# Patient Record
Sex: MALE | Race: OTHER
[De-identification: names, ages, dates, MRNs, and addresses within clinical notes are randomized per-mention and may not be internally consistent; named-entity substitution may affect disease eponyms.]

---

## 2019-09-27 ENCOUNTER — HOSPITAL ENCOUNTER (OUTPATIENT)
Dept: HOSPITAL 72 - SUR | Age: 44
Discharge: HOME | End: 2019-09-27
Payer: COMMERCIAL

## 2019-09-27 VITALS — SYSTOLIC BLOOD PRESSURE: 127 MMHG | DIASTOLIC BLOOD PRESSURE: 58 MMHG

## 2019-09-27 VITALS — DIASTOLIC BLOOD PRESSURE: 60 MMHG | SYSTOLIC BLOOD PRESSURE: 113 MMHG

## 2019-09-27 VITALS — BODY MASS INDEX: 23.39 KG/M2 | HEIGHT: 63 IN | WEIGHT: 132 LBS

## 2019-09-27 VITALS — DIASTOLIC BLOOD PRESSURE: 56 MMHG | SYSTOLIC BLOOD PRESSURE: 125 MMHG

## 2019-09-27 VITALS — DIASTOLIC BLOOD PRESSURE: 58 MMHG | SYSTOLIC BLOOD PRESSURE: 121 MMHG

## 2019-09-27 VITALS — DIASTOLIC BLOOD PRESSURE: 56 MMHG | SYSTOLIC BLOOD PRESSURE: 123 MMHG

## 2019-09-27 VITALS — DIASTOLIC BLOOD PRESSURE: 79 MMHG | SYSTOLIC BLOOD PRESSURE: 121 MMHG

## 2019-09-27 VITALS — DIASTOLIC BLOOD PRESSURE: 61 MMHG | SYSTOLIC BLOOD PRESSURE: 128 MMHG

## 2019-09-27 VITALS — SYSTOLIC BLOOD PRESSURE: 124 MMHG | DIASTOLIC BLOOD PRESSURE: 56 MMHG

## 2019-09-27 DIAGNOSIS — Z79.899: ICD-10-CM

## 2019-09-27 DIAGNOSIS — I10: ICD-10-CM

## 2019-09-27 DIAGNOSIS — M75.42: Primary | ICD-10-CM

## 2019-09-27 DIAGNOSIS — E78.00: ICD-10-CM

## 2019-09-27 DIAGNOSIS — E11.9: ICD-10-CM

## 2019-09-27 DIAGNOSIS — Z79.84: ICD-10-CM

## 2019-09-27 PROCEDURE — 94150 VITAL CAPACITY TEST: CPT

## 2019-09-27 PROCEDURE — 29822 SHO ARTHRS SRG LMTD DBRDMT: CPT

## 2019-09-27 PROCEDURE — 94003 VENT MGMT INPAT SUBQ DAY: CPT

## 2019-09-27 PROCEDURE — 82962 GLUCOSE BLOOD TEST: CPT

## 2019-09-27 NOTE — OPERATIVE NOTE - DICTATED
DATE OF OPERATION:  09/27/2019

PREOPERATIVE DIAGNOSIS:  Left shoulder impingement syndrome.



POSTOPERATIVE DIAGNOSIS:  Left shoulder impingement syndrome.



PROCEDURE:

1. Left shoulder diagnostic arthroscopy.

2. Left shoulder subacromial decompression bursectomy.



SURGEON:  Milton Lee M.D.



ANESTHESIA:  Interscalene general.



INDICATION FOR PROCEDURE:  The patient is a pleasant gentleman with

continued left shoulder pain, failed conservative treatment, elected to

undergo left shoulder diagnostic arthroscopy and subacromial decompression

bursectomy.  Risks, limitations, expectations, complications of procedure

were discussed in detail.  All questions addressed.



DESCRIPTION OF PROCEDURE:  After informed consent was obtained, the patient

was brought to the operating room.  The patient was placed under

interscalene general anesthesia.  The patient then placed in a beach chair

position.  Left shoulder was prepped and draped in a sterile manner.

Time-out was performed.  Inferolateral stab incision was then made.

Trocar was introduced into the glenohumeral joint.  There is no

significant chondral damage.  The anterior labrum appeared to be intact

along with the superior labrum.  The biceps tendon was intact along with

the subscap.  The supraspinatus and infraspinatus were intact.



At this point, the camera was then placed in the subacromial space.

There was hypertrophic bursal tissue.  Undersurface of acromion was

identified.  Acromioplasty was started from medial to lateral completed

from posterior to anterior.  The bursectomy was completed.  Instruments

removed.  Portal sites were closed with 3-0 Monocryl sutures.

Steri-Strips and a sterile dressing were applied.



ESTIMATED BLOOD LOSS:  None.



COMPLICATIONS:  None.



SPECIMENS:  None.



IMPLANTS:  None.









  ______________________________________________

  Milton Lee M.D.





DR:  ANAY

D:  09/27/2019 14:06

T:  09/27/2019 17:51

JOB#:  [id number]

CC:

## 2019-09-27 NOTE — ANETHESIA PREOPERATIVE EVAL
Anesthesia Pre-op PMH/ROS


General


Date of Evaluation:  Sep 27, 2019


Time of Evaluation:  12:32


Anesthesiologist:  Juan


ASA Score:  ASA 3


Mallampati Score


Class I : Soft palate, uvula, fauces, pillars visible


Class II: Soft palate, uvula, fauces visible


Class III: Soft palate, base of uvula visible


Class IV: Only hard plate visible


Mallampati Classification:  Class II


Surgeon:  Jesus


Diagnosis:  L Shoulder Pain


Surgical Procedure:  L Shoulder Arthroscopy


Anesthesia History:  none


Family History:  no anesthesia problems


Allergies:  


Coded Allergies:  


     No Known Allergies (Unverified , 19)


Medications:  see eMAR


Patient NPO?:  Yes





Past Medical History


Cardiovascular:  Reports: HTN, other - HL


Endocrine:  Reports: DM





Anesthesia Pre-op Phys. Exam


Physician Exam





Last Vital Signs








  Date Time  Temp Pulse Resp B/P (MAP) Pulse Ox O2 Delivery O2 Flow Rate FiO2


 


19 11:56      Room Air  


 


19 11:44 97.7 60 18 113/60 99   








Constitutional:  NAD


Neurologic:  CN 2-12 intact


Cardiovascular:  RRR


Respiratory:  CTA


Gastrointestinal:  S/NT/ND





Airway Exam


Mallampati Score:  Class II


MO:  full


ROM:  full


Teeth:  intact





Anesthesia Pre-op A/P


Risk Assessment & Plan


Assessment:


ASA 3


Plan:


GA, SED, Supraclavicular Block





Pre-Antibiotics


Dru Gram Ancef IV


Given Within 1 Hr of Incision:  Yes


Time Given:  12:55











Antonio Martinez MD Sep 27, 2019 12:18

## 2019-09-27 NOTE — PRE-PROCEDURE NOTE/ATTESTATION
Pre-Procedure Note/Attestation


Complete Prior to Procedure


Planned Procedure:  left


Procedure Narrative:


shoulder arthroscopy, sad





Indications for Procedure


Pre-Operative Diagnosis:


left shoulder impingement





Attestation


I attest that I discussed the nature of the procedure; its benefits; risks and 

complications; and alternatives (and the risks and benefits of such alternatives

), prior to the procedure, with the patient (or the patient's legal 

representative).





I attest that, if there was a reasonable possibility of needing a blood 

transfusion, the patient (or the patient's legal representative) was given the 

[facility] standardized written summary, pursuant 

to the [place] Blood Safety Act ([place] Health and Safety Code # 1645, as 

amended).





I attest that I re-evaluated the patient just prior to the surgery and that 

there has been no change in the patient's H&P, except as documented below:











Milton Lee MD Sep 27, 2019 07:28

## 2019-09-27 NOTE — IMMEDIATE POST-OP EVALUATION
Immediate Post-Op Evalulation


Immediate Post-Op Evalulation


Procedure:  L Shoulder Arthroscopy


Date of Evaluation:  Sep 27, 2019


Time of Evaluation:  14:37


IV Fluids:  600 LR


Blood Products:  0


Estimated Blood Loss:  10


Urinary Output:  0


Blood Pressure Systolic:  128


Blood Pressure Diastolic:  61


Pulse Rate:  91


Respiratory Rate:  16


O2 Sat by Pulse Oximetry:  100


Temperature (Fahrenheit):  97


Pain Score (1-10):  2


Nausea:  No


Vomiting:  No


Complications


0


Patient Status:  awake, reacts, patent, extubated, none


Hydration Status:  adequate


Dru Gram Ancef IV


Given Within 1 Hr of Incision:  Yes


Time Given:  12:55











Antonio Martinez MD Sep 27, 2019 12:19

## 2019-09-27 NOTE — 48 HOUR POST ANESTHESIA EVAL
Post Anesthesia Evaluation


Procedure:  L Shoulder Arthroscopy


Date of Evaluation:  Sep 27, 2019


Time of Evaluation:  16:47


Blood Pressure Systolic:  132


0:  78


Pulse Rate:  82


Respiratory Rate:  18


Temperature (Fahrenheit):  98.2


O2 Sat by Pulse Oximetry:  99


Airway:  patent


Nausea:  No


Vomiting:  No


Pain Intensity:  1


Hydration Status:  adequate


Cardiopulmonary Status:


Stable


Mental Status/LOC:  patient returned to baseline


Follow-up Care/Observations:


0


Post-Anesthesia Complications:


0


Follow-up care needed:  ready to discharge











Antonio Martinez MD Sep 27, 2019 12:20

## 2019-09-27 NOTE — OPERATIVE NOTE - PDOC
Operative Note


Operative Note


Pre-op Diagnosis:


left shoulder impingement


Procedure:


see op report


Post-op Diagnosis:  same as pre-op plus


Operative Findings:  consistent w/pre-op dx studies


Anesthesia:  MAC


Specimen:  none


Complications:  none


Condition:  stable


Estimated Blood Loss:  none


Implant(s) used?:  No











Milton Lee MD Sep 27, 2019 07:28